# Patient Record
Sex: MALE | Race: WHITE | Employment: UNEMPLOYED | URBAN - METROPOLITAN AREA
[De-identification: names, ages, dates, MRNs, and addresses within clinical notes are randomized per-mention and may not be internally consistent; named-entity substitution may affect disease eponyms.]

---

## 2018-06-14 ENCOUNTER — OFFICE VISIT (OUTPATIENT)
Dept: CARDIOLOGY CLINIC | Age: 53
End: 2018-06-14

## 2018-06-14 VITALS
WEIGHT: 315 LBS | HEIGHT: 71 IN | SYSTOLIC BLOOD PRESSURE: 124 MMHG | HEART RATE: 93 BPM | BODY MASS INDEX: 44.1 KG/M2 | OXYGEN SATURATION: 95 % | DIASTOLIC BLOOD PRESSURE: 70 MMHG

## 2018-06-14 DIAGNOSIS — G47.30 SLEEP APNEA, UNSPECIFIED TYPE: ICD-10-CM

## 2018-06-14 DIAGNOSIS — I10 ESSENTIAL HYPERTENSION: ICD-10-CM

## 2018-06-14 DIAGNOSIS — I48.91 ATRIAL FIBRILLATION, UNSPECIFIED TYPE (HCC): ICD-10-CM

## 2018-06-14 DIAGNOSIS — F17.200 SMOKING: ICD-10-CM

## 2018-06-14 DIAGNOSIS — Z95.0 CARDIAC PACEMAKER IN SITU: Primary | ICD-10-CM

## 2018-06-14 PROCEDURE — 93280 PM DEVICE PROGR EVAL DUAL: CPT | Performed by: INTERNAL MEDICINE

## 2018-06-14 PROCEDURE — 4004F PT TOBACCO SCREEN RCVD TLK: CPT | Performed by: INTERNAL MEDICINE

## 2018-06-14 PROCEDURE — 99214 OFFICE O/P EST MOD 30 MIN: CPT | Performed by: INTERNAL MEDICINE

## 2018-06-14 PROCEDURE — G8427 DOCREV CUR MEDS BY ELIG CLIN: HCPCS | Performed by: INTERNAL MEDICINE

## 2018-06-14 PROCEDURE — 3017F COLORECTAL CA SCREEN DOC REV: CPT | Performed by: INTERNAL MEDICINE

## 2018-06-14 PROCEDURE — G8417 CALC BMI ABV UP PARAM F/U: HCPCS | Performed by: INTERNAL MEDICINE

## 2018-08-05 PROBLEM — N17.9 AKI (ACUTE KIDNEY INJURY) (HCC): Status: ACTIVE | Noted: 2018-08-05

## 2018-08-05 PROBLEM — N17.9 ACUTE RENAL FAILURE (ARF) (HCC): Status: ACTIVE | Noted: 2018-08-05

## 2018-08-05 PROBLEM — E87.29 HIGH ANION GAP METABOLIC ACIDOSIS: Status: ACTIVE | Noted: 2018-08-05

## 2018-08-05 PROBLEM — R77.8 ELEVATED TROPONIN: Status: ACTIVE | Noted: 2018-08-05

## 2018-08-05 PROBLEM — R79.89 ELEVATED TROPONIN: Status: ACTIVE | Noted: 2018-08-05

## 2018-08-05 PROBLEM — D72.829 LEUKOCYTOSIS: Status: ACTIVE | Noted: 2018-08-05

## 2018-08-06 PROBLEM — E66.01 MORBID OBESITY DUE TO EXCESS CALORIES (HCC): Status: ACTIVE | Noted: 2018-08-06

## 2018-09-04 PROBLEM — R79.89 ELEVATED TROPONIN: Status: RESOLVED | Noted: 2018-08-05 | Resolved: 2018-09-04

## 2018-09-04 PROBLEM — R77.8 ELEVATED TROPONIN: Status: RESOLVED | Noted: 2018-08-05 | Resolved: 2018-09-04

## 2019-06-13 ENCOUNTER — OFFICE VISIT (OUTPATIENT)
Dept: CARDIOLOGY CLINIC | Age: 54
End: 2019-06-13
Payer: MEDICARE

## 2019-06-13 ENCOUNTER — PROCEDURE VISIT (OUTPATIENT)
Dept: CARDIOLOGY CLINIC | Age: 54
End: 2019-06-13
Payer: MEDICARE

## 2019-06-13 VITALS
BODY MASS INDEX: 44.1 KG/M2 | DIASTOLIC BLOOD PRESSURE: 80 MMHG | SYSTOLIC BLOOD PRESSURE: 122 MMHG | HEIGHT: 71 IN | HEART RATE: 94 BPM | WEIGHT: 315 LBS

## 2019-06-13 DIAGNOSIS — I10 BENIGN ESSENTIAL HTN: ICD-10-CM

## 2019-06-13 DIAGNOSIS — Z95.0 CARDIAC PACEMAKER IN SITU: ICD-10-CM

## 2019-06-13 DIAGNOSIS — E66.9 OBESITY (BMI 30-39.9): ICD-10-CM

## 2019-06-13 DIAGNOSIS — G47.33 OSA (OBSTRUCTIVE SLEEP APNEA): ICD-10-CM

## 2019-06-13 DIAGNOSIS — I48.91 ATRIAL FIBRILLATION, UNSPECIFIED TYPE (HCC): Primary | ICD-10-CM

## 2019-06-13 DIAGNOSIS — F17.200 SMOKING: ICD-10-CM

## 2019-06-13 PROCEDURE — 99214 OFFICE O/P EST MOD 30 MIN: CPT | Performed by: INTERNAL MEDICINE

## 2019-06-13 PROCEDURE — 4004F PT TOBACCO SCREEN RCVD TLK: CPT | Performed by: INTERNAL MEDICINE

## 2019-06-13 PROCEDURE — 93000 ELECTROCARDIOGRAM COMPLETE: CPT | Performed by: INTERNAL MEDICINE

## 2019-06-13 PROCEDURE — G8417 CALC BMI ABV UP PARAM F/U: HCPCS | Performed by: INTERNAL MEDICINE

## 2019-06-13 PROCEDURE — 3017F COLORECTAL CA SCREEN DOC REV: CPT | Performed by: INTERNAL MEDICINE

## 2019-06-13 PROCEDURE — 93280 PM DEVICE PROGR EVAL DUAL: CPT | Performed by: INTERNAL MEDICINE

## 2019-06-13 PROCEDURE — G8427 DOCREV CUR MEDS BY ELIG CLIN: HCPCS | Performed by: INTERNAL MEDICINE

## 2019-06-13 RX ORDER — LISINOPRIL 20 MG/1
20 TABLET ORAL DAILY
COMMUNITY

## 2019-06-13 RX ORDER — NICOTINE 21 MG/24HR
1 PATCH, TRANSDERMAL 24 HOURS TRANSDERMAL EVERY 24 HOURS
COMMUNITY
End: 2022-01-19

## 2019-06-13 RX ORDER — LEVOCARNITINE 330 MG/1
330 TABLET ORAL 2 TIMES DAILY
COMMUNITY

## 2019-08-17 ENCOUNTER — HOSPITAL ENCOUNTER (EMERGENCY)
Age: 54
Discharge: HOME | End: 2019-08-17
Attending: EMERGENCY MEDICINE
Payer: MEDICARE

## 2019-08-17 VITALS
TEMPERATURE: 98.4 F | SYSTOLIC BLOOD PRESSURE: 148 MMHG | HEIGHT: 72 IN | DIASTOLIC BLOOD PRESSURE: 78 MMHG | WEIGHT: 315 LBS | RESPIRATION RATE: 16 BRPM | OXYGEN SATURATION: 98 % | BODY MASS INDEX: 42.66 KG/M2 | HEART RATE: 88 BPM

## 2019-08-17 DIAGNOSIS — H57.89 PERIORBITAL SWELLING: ICD-10-CM

## 2019-08-17 DIAGNOSIS — B30.9 ACUTE VIRAL CONJUNCTIVITIS OF LEFT EYE: Primary | ICD-10-CM

## 2019-08-17 PROCEDURE — 99282 EMERGENCY DEPT VISIT SF MDM: CPT

## 2019-08-17 RX ORDER — KETOTIFEN FUMARATE 0.35 MG/ML
1 SOLUTION/ DROPS OPHTHALMIC 2 TIMES DAILY
Qty: 5 ML | Refills: 0 | Status: SHIPPED | OUTPATIENT
Start: 2019-08-17 | End: 2019-08-27

## 2019-08-17 ASSESSMENT — PAIN DESCRIPTION - PROGRESSION: CLINICAL_PROGRESSION: NOT CHANGED

## 2019-08-17 ASSESSMENT — PAIN DESCRIPTION - ONSET: ONSET: ON-GOING

## 2019-08-17 ASSESSMENT — PAIN - FUNCTIONAL ASSESSMENT: PAIN_FUNCTIONAL_ASSESSMENT: PREVENTS OR INTERFERES SOME ACTIVE ACTIVITIES AND ADLS

## 2019-08-17 ASSESSMENT — PAIN DESCRIPTION - LOCATION: LOCATION: EYE

## 2019-08-17 ASSESSMENT — PAIN DESCRIPTION - FREQUENCY: FREQUENCY: CONTINUOUS

## 2019-08-17 ASSESSMENT — PAIN DESCRIPTION - DESCRIPTORS: DESCRIPTORS: ACHING

## 2019-08-17 ASSESSMENT — PAIN SCALES - GENERAL
PAINLEVEL_OUTOF10: 5
PAINLEVEL_OUTOF10: 8

## 2019-08-17 ASSESSMENT — PAIN DESCRIPTION - ORIENTATION: ORIENTATION: LEFT

## 2019-08-17 ASSESSMENT — PAIN DESCRIPTION - PAIN TYPE: TYPE: ACUTE PAIN

## 2019-08-17 NOTE — ED PROVIDER NOTES
eMERGENCY dEPARTMENT eNCOUnter      Pt Name: Adolph Mccarthy  MRN: [[id number]]  [place] [id number]  Date of evaluation: 8/17/2019  Provider: David Acevedo MD     [address] [place] [address]       Chief Complaint   Patient presents with    Facial Swelling     Left eye swelling has been taking eye drops, states is painful. HISTORY OF PRESENT ILLNESS   (Location/Symptom, Timing/Onset,Context/Setting, Quality, Duration, Modifying Factors, Severity) Note limiting factors. HPI    Adolph Mccarthy is a 48 y.o. male who presents to the emergency department presents with left eye swelling. Patient apparently was seen by family doctor about 10 to 15 days ago with conjunctivitis. Was placed on gentamicin. Patient continues to rub it patient has a caregiver here. Caregiver has been taking care of him for about 2 years. Patient has history of bipolar. Patient does not understand because it itches and he is been rubbing it until now the lower lid is swollen. No drainage. His conjunctiva is still red. Nursing Notes were reviewed. REVIEW OFSYSTEMS    (2+ for level 4; 10+ for level 5)   Review of Systems    General: No fevers, chills or night sweats, No weight loss    Head:  No Sore throat,  No Ear Pain    Chest:  Nontender. No Cough, No SOB,  Chest Pain    GI: No abdominal pain or vomiting    : No dysuria or hematuria    Musculoskeletal: No unrelenting pain or night pain    Neurologic: No bowel or bladder incontinence, No saddle anesthesia, No leg weakness    All other systems reviewed and are negative.         PAST MEDICAL HISTORY     Past Medical History:   Diagnosis Date    Development delay     Diabetes mellitus ([facility] [address]     Hyperlipidemia     Hypertension     Obesity        SURGICAL HISTORY       Past Surgical History:   Procedure Laterality Date    PACEMAKER INSERTION  2010       CURRENT MEDICATIONS       Discharge Medication List as of 8/17/2019  5:53 PM      CONTINUE these medications which have NOT

## 2019-09-24 ENCOUNTER — NURSE ONLY (OUTPATIENT)
Dept: CARDIOLOGY CLINIC | Age: 54
End: 2019-09-24
Payer: MEDICARE

## 2019-09-24 DIAGNOSIS — Z95.0 CARDIAC PACEMAKER IN SITU: Primary | ICD-10-CM

## 2019-09-24 PROCEDURE — 93296 REM INTERROG EVL PM/IDS: CPT | Performed by: INTERNAL MEDICINE

## 2019-09-24 PROCEDURE — 93294 REM INTERROG EVL PM/LDLS PM: CPT | Performed by: INTERNAL MEDICINE

## 2020-01-10 ENCOUNTER — TELEPHONE (OUTPATIENT)
Dept: CARDIOLOGY CLINIC | Age: 55
End: 2020-01-10

## 2020-01-10 NOTE — TELEPHONE ENCOUNTER
Called pt spoke to his care giver to send remote transmission anytime on 01/14/2020. She stated that pt sent one a day or two ago.  Pls call to advise Thank you

## 2020-01-13 NOTE — PROGRESS NOTES
Carelink transmission shows normal sensing and pacing function. Noted AT. See interrogation for more details.

## 2020-01-14 ENCOUNTER — NURSE ONLY (OUTPATIENT)
Dept: CARDIOLOGY CLINIC | Age: 55
End: 2020-01-14
Payer: MEDICARE

## 2020-01-14 PROCEDURE — 93294 REM INTERROG EVL PM/LDLS PM: CPT | Performed by: INTERNAL MEDICINE

## 2020-01-14 PROCEDURE — 93296 REM INTERROG EVL PM/IDS: CPT | Performed by: INTERNAL MEDICINE

## 2020-01-14 NOTE — LETTER
[address] [facility] [phone] [address] [phone]    Pacemaker/Defibrillator Clinic          01/13/20        [address] [address]        Dear Braeden Mauricio    This letter is to inform you that we received the transmission from your monitor at home that checks your implanted heart device. The next date you should transmit will be 4-15-20. If your report requires attention, we will notify you. Please be aware that the remote device transmission sites are periodically monitored only during regular business hours during which simultaneous in-office device clinics are being run. If your transmission requires attention, we will contact you as soon as possible. Thank you.             [address]

## 2020-03-25 PROBLEM — N17.9 ACUTE RENAL FAILURE (ARF) (HCC): Status: RESOLVED | Noted: 2018-08-05 | Resolved: 2020-03-24

## 2020-04-15 ENCOUNTER — NURSE ONLY (OUTPATIENT)
Dept: CARDIOLOGY CLINIC | Age: 55
End: 2020-04-15
Payer: MEDICARE

## 2020-04-15 PROCEDURE — 93296 REM INTERROG EVL PM/IDS: CPT | Performed by: INTERNAL MEDICINE

## 2020-04-15 PROCEDURE — 93294 REM INTERROG EVL PM/LDLS PM: CPT | Performed by: INTERNAL MEDICINE

## 2020-07-15 ENCOUNTER — NURSE ONLY (OUTPATIENT)
Dept: CARDIOLOGY CLINIC | Age: 55
End: 2020-07-15
Payer: MEDICARE

## 2020-07-15 PROCEDURE — 93296 REM INTERROG EVL PM/IDS: CPT | Performed by: INTERNAL MEDICINE

## 2020-07-15 PROCEDURE — 93294 REM INTERROG EVL PM/LDLS PM: CPT | Performed by: INTERNAL MEDICINE

## 2020-07-15 NOTE — PROGRESS NOTES
Carelink transmission shows normal sensing and pacing function. Noted ST. Pt is not on a beta blocker. See interrogation for more details.

## 2020-07-15 NOTE — LETTER
[address] [facility] [phone]  Luige Ramon [address] [facility] [phone]    Pacemaker/Defibrillator Clinic          07/15/20        [address] [address]        Dear Kg Reilly    This letter is to inform you that we received the transmission from your monitor at home that checks your implanted heart device. The next date you should transmit will be 10-19-20. If your report requires attention, we will notify you. Please be aware that the remote device transmission sites are periodically monitored only during regular business hours during which simultaneous in-office device clinics are being run. If your transmission requires attention, we will contact you as soon as possible. Thank you.             Hang Astudillo

## 2020-10-19 ENCOUNTER — NURSE ONLY (OUTPATIENT)
Dept: CARDIOLOGY CLINIC | Age: 55
End: 2020-10-19
Payer: MEDICARE

## 2020-10-19 PROCEDURE — 93296 REM INTERROG EVL PM/IDS: CPT | Performed by: INTERNAL MEDICINE

## 2020-10-19 PROCEDURE — 93294 REM INTERROG EVL PM/LDLS PM: CPT | Performed by: INTERNAL MEDICINE

## 2020-10-19 NOTE — LETTER
[address] [facility] [phone] [address] [phone]    Pacemaker/Defibrillator Clinic          10/21/20        [address] [facility] [address] [address]        Dear Griselda Powell    This letter is to inform you that we received the transmission from your monitor at home that checks your implanted heart device. The next date you should transmit will be 1-19-21. If your report requires attention, we will notify you. Please be aware that the remote device transmission sites are periodically monitored only during regular business hours during which simultaneous in-office device clinics are being run. If your transmission requires attention, we will contact you as soon as possible. Thank you.             Alyce [address]

## 2020-10-21 NOTE — PROGRESS NOTES
We received remote transmission from patient's monitor at home. Transmission shows normal sensing and pacing function. Noted AT. EP physician will review. See interrogation under cardiology tab in the [address] [facility] [address] field for more details.

## 2020-10-26 ENCOUNTER — TELEPHONE (OUTPATIENT)
Dept: CARDIOLOGY CLINIC | Age: 55
End: 2020-10-26

## 2020-10-26 NOTE — TELEPHONE ENCOUNTER
----- Message from Miguel Polo MD sent at 10/21/2020  9:46 AM EDT -----  Needs f/u anytime next year with NP

## 2021-01-12 PROBLEM — G47.33 OBSTRUCTIVE SLEEP APNEA: Status: ACTIVE | Noted: 2021-01-12

## 2021-01-12 PROBLEM — Z95.0 PACEMAKER: Status: ACTIVE | Noted: 2021-01-12

## 2021-01-12 PROBLEM — Z72.0 TOBACCO ABUSE: Status: ACTIVE | Noted: 2021-01-12

## 2021-01-12 NOTE — PROGRESS NOTES
[address]   Electrophysiology Follow Up   Date: 1/13/2021      No chief complaint on file. HPI: Katina Rodriguez is a 50 y.o. being seen for a maintenance evaluation for atrial fibrillation. PMH developmental delay, YURI, HLD. S/P pacemaker implant due to high degree AV block with pauses up to 10 sec     Interval Hx: Jasen Raphael presents today in follow up. Patient denies lightheadedness, dizziness, chest pain, palpitations, orthopnea, edema, presyncope or syncope. States he is still smoking. Past Medical History:   Diagnosis Date    Development delay     Diabetes mellitus ([facility] [address]     Hyperlipidemia     Hypertension     Obesity     YURI    Past Surgical History:   Procedure Laterality Date    PACEMAKER INSERTION  2010       Allergies:  No Known Allergies    Social History:   reports that he has been smoking. He has never used smokeless tobacco. He reports that he does not drink alcohol or use drugs. Family History:     Reviewed. Denies family history of sudden cardiac death, arrhythmia, premature CAD    Review of System:  All other systems reviewed and are negative except for that noted above. Pertinent negatives are:   General: negative for fever, chills   Ophthalmic ROS: negative for - eye pain or loss of vision  ENT ROS: negative for - headaches, sore throat   Respiratory: negative for - cough, sputum  Cardiovascular: Reviewed in HPI  Gastrointestinal: negative for - abdominal pain, diarrhea, N/V  Hematology: negative for - bleeding, blood clots, bruising or jaundice  Genito-Urinary:  negative for - Dysuria or incontinence  Musculoskeletal: negative for - Joint swelling, muscle pain. Knee pain  Neurological: negative for - confusion, dizziness, headaches   Psychiatric: No anxiety, no depression.   Dermatological: negative for - rash    Physical Examination:  Vitals:    01/13/21 1339   BP: 136/75   Pulse: 94   SpO2: 96%      Wt Readings from Last 3 Encounters: 21 (!) 314 lb (142.4 kg)   19 (!) 354 lb 15.1 oz (161 kg)   19 (!) 357 lb (161.9 kg)       · Constitutional: Oriented. No distress. · Head: Normocephalic and atraumatic. · Mouth/Throat: Oropharynx is clear and moist.   · Eyes: Conjunctivae normal. EOM are normal.   · Neck: Neck supple. No rigidity. No JVD present. · Cardiovascular: Normal rate, regular rhythm, S1&S2. · Pulmonary/Chest: Bilateral respiratory sounds. No wheezes, No rhonchi. · Abdominal: Soft. Bowel sounds present. No distension, No tenderness. · Musculoskeletal: No tenderness. No edema  Knee pain  · Lymphadenopathy: Has no cervical adenopathy. · Neurological: Alert and oriented. Cranial nerve appears intact, No Gross deficit   · Skin: Skin is warm and dry. No rash noted. · Psychiatric: Has a normal behavior       Labs, diagnostic and imaging results reviewed. Reviewed. Lab Results   Component Value Date    CREATININE 1.2 2018    CREATININE 1.4 2018    AST 24 2018    ALT 25 2018     EC2021: Sinus Rhythm    Echo 2D complete 2016  - Study data: Comparison was made to the study of 2010. No  change. - Left ventricle: The cavity size was normal. Wall thickness was  normal. Systolic function was normal. The estimated ejection  fraction was in the range of 50% to 55%. Wall motion was normal;  there were no regional wall motion abnormalities    2010-ECHO  Study Conclusions  Left ventricle: The cavity size was normal. Wall thickness was normal. Systolic function was normal. The estimated ejection fraction was in the range of 55% to 60%. Left ventricular diastolic function parameters were normal. Impressions: Compared to the prior study, there has been no significant interval change.     Echo 3/25/08 STUDY CONCLUSIONS  divalproex (DEPAKOTE ER) 250 MG extended release tablet Take 250 mg by mouth every morning (Take with 500 mg tablet)      atorvastatin (LIPITOR) 20 MG tablet Take 20 mg by mouth daily      perphenazine 8 MG tablet Take 8 mg by mouth 3 times daily      benztropine (COGENTIN) 0.5 MG tablet Take 0.5 mg by mouth every evening      topiramate (TOPAMAX) 50 MG tablet Take 50 mg by mouth 2 times daily (Take with 100 mg tablets)      divalproex (DEPAKOTE ER) 500 MG ER tablet Take 500 mg by mouth nightly      aspirin 81 MG tablet Take 81 mg by mouth daily.  guanFACINE (TENEX) 1 MG tablet Take 1.5 mg by mouth 2 times daily @ 0800 and 1600      Asenapine Maleate (SAPHRIS) 10 MG SUBL Place 10 mg under the tongue 2 times daily       nicotine (NICODERM CQ) 21 MG/24HR Place 1 patch onto the skin every 24 hours       No current facility-administered medications for this visit. Assessment and plan:     Atrial fibrillation:   -EKG 1/13/21 Sinus Rhythm   - no Atrial fibrillation on device interrogation today. No need for anticoagulation    No recurrence     CARDIAC PACEMAKER IN SITU (ICD-V45.01)   - s/p Dual chamber pacemaker 10/27/10   The CIED was interrogated and programmed and I supervised and reviewed all the data. All findings and changes are in device interrogation sheat and reflect my personal interpretation and changes and is scanned to Epic. AP 16.2,  3.7,DELMA 3 y,     YURI  -encouraged compliance with CPAP    Smoking  Advised to stop again. HTN  Vitals:    01/13/21 1339   BP: 136/75   Pulse: 94   SpO2: 96%     -Home BP monitoring encourage with a BP goal <130/80   - on lisinopril  20 mg daily. BP is acceptable. Obesity   Excessive weight is complicating assessment and treatment. It is placing patient at risk for multiple co-morbidities as well as early death and contributing to the patient's presentation.    - discussed weight management with diet and exercise Seems to have lost some weight based on weight measurements and I reemphasized that for him. - The patient is counseled to follow a low salt diet to assure blood pressure remains controlled for cardiovascular risk factor modification.   - The patient is counseled to avoid excess caffeine, and energy drinks as this may exacerbated ectopy and arrhythmia. - The patient is counseled to get regular exercise 3-5 times per week to control cardiovascular risk factors. - The patient is counseled to lose weigt to control cardiovascular risk factors. Plan:   Follow up with EP NP in one year. I independently reviewed *device check interrogation     Thank you for allowing me to participate in the care of Daniel Vera. Further evaluation will be based upon the patient's clinical course and testing results. All questions and concerns were addressed to the patient/family. Alternatives to my treatment were discussed. I have discussed the above stated plan and the patient verbalized understanding and agreed with the plan. NOTE: This report was transcribed using voice recognition software. Every effort was made to ensure accuracy, however, inadvertent computerized transcription errors may be present.        Shelbie Vincent MD, [facility], [address] [place] [address]   Office: [phone]   I, Dr. Shelbie Vincent personally performed the services described in this documentation as scribed by RN in my presence, and it is both accurate and complete.

## 2021-01-13 ENCOUNTER — OFFICE VISIT (OUTPATIENT)
Dept: CARDIOLOGY CLINIC | Age: 56
End: 2021-01-13
Payer: MEDICARE

## 2021-01-13 ENCOUNTER — NURSE ONLY (OUTPATIENT)
Dept: CARDIOLOGY CLINIC | Age: 56
End: 2021-01-13
Payer: MEDICARE

## 2021-01-13 VITALS
DIASTOLIC BLOOD PRESSURE: 75 MMHG | WEIGHT: 314 LBS | BODY MASS INDEX: 42.53 KG/M2 | HEART RATE: 94 BPM | SYSTOLIC BLOOD PRESSURE: 136 MMHG | OXYGEN SATURATION: 96 % | HEIGHT: 72 IN

## 2021-01-13 DIAGNOSIS — I49.5 SSS (SICK SINUS SYNDROME) (HCC): ICD-10-CM

## 2021-01-13 DIAGNOSIS — I48.91 ATRIAL FIBRILLATION, UNSPECIFIED TYPE (HCC): Primary | ICD-10-CM

## 2021-01-13 DIAGNOSIS — I44.30 AV BLOCK: ICD-10-CM

## 2021-01-13 DIAGNOSIS — Z95.0 PACEMAKER: ICD-10-CM

## 2021-01-13 DIAGNOSIS — I10 HYPERTENSION, UNSPECIFIED TYPE: ICD-10-CM

## 2021-01-13 DIAGNOSIS — E66.01 MORBID OBESITY DUE TO EXCESS CALORIES (HCC): ICD-10-CM

## 2021-01-13 DIAGNOSIS — Z72.0 TOBACCO ABUSE: ICD-10-CM

## 2021-01-13 DIAGNOSIS — G47.33 OBSTRUCTIVE SLEEP APNEA: ICD-10-CM

## 2021-01-13 DIAGNOSIS — Z95.0 CARDIAC PACEMAKER IN SITU: ICD-10-CM

## 2021-01-13 PROCEDURE — G8417 CALC BMI ABV UP PARAM F/U: HCPCS | Performed by: INTERNAL MEDICINE

## 2021-01-13 PROCEDURE — G8484 FLU IMMUNIZE NO ADMIN: HCPCS | Performed by: INTERNAL MEDICINE

## 2021-01-13 PROCEDURE — 3017F COLORECTAL CA SCREEN DOC REV: CPT | Performed by: INTERNAL MEDICINE

## 2021-01-13 PROCEDURE — 93000 ELECTROCARDIOGRAM COMPLETE: CPT | Performed by: INTERNAL MEDICINE

## 2021-01-13 PROCEDURE — 93280 PM DEVICE PROGR EVAL DUAL: CPT | Performed by: INTERNAL MEDICINE

## 2021-01-13 PROCEDURE — 4004F PT TOBACCO SCREEN RCVD TLK: CPT | Performed by: INTERNAL MEDICINE

## 2021-01-13 PROCEDURE — 99214 OFFICE O/P EST MOD 30 MIN: CPT | Performed by: INTERNAL MEDICINE

## 2021-01-13 PROCEDURE — G8427 DOCREV CUR MEDS BY ELIG CLIN: HCPCS | Performed by: INTERNAL MEDICINE

## 2021-01-14 NOTE — PROGRESS NOTES
Patient comes in for programming evaluation for his pacemaker. All sensing and pacing parameters are within normal range. Battery life 3 years  AP 16.4%.  3.2%. No changes need to be made at this time. Please see interrogation for more detail. Patient will see Dr. Rubi Arambula today and follow up in 3 months in office or remotely.

## 2021-04-19 ENCOUNTER — NURSE ONLY (OUTPATIENT)
Dept: CARDIOLOGY CLINIC | Age: 56
End: 2021-04-19
Payer: MEDICARE

## 2021-04-19 DIAGNOSIS — Z95.0 CARDIAC PACEMAKER IN SITU: ICD-10-CM

## 2021-04-19 NOTE — PROGRESS NOTES
We received remote transmission from patient's monitor at home. Transmission shows normal sensing and pacing function. Noted AT. EP physician will review. See interrogation under cardiology tab in the [address] [facility] [address] field for more details.

## 2021-05-07 PROCEDURE — 93296 REM INTERROG EVL PM/IDS: CPT | Performed by: INTERNAL MEDICINE

## 2021-05-07 PROCEDURE — 93294 REM INTERROG EVL PM/LDLS PM: CPT | Performed by: INTERNAL MEDICINE

## 2021-07-20 ENCOUNTER — NURSE ONLY (OUTPATIENT)
Dept: CARDIOLOGY CLINIC | Age: 56
End: 2021-07-20

## 2021-07-20 DIAGNOSIS — Z95.0 CARDIAC PACEMAKER IN SITU: ICD-10-CM

## 2021-07-20 PROCEDURE — 93296 REM INTERROG EVL PM/IDS: CPT | Performed by: INTERNAL MEDICINE

## 2021-07-20 PROCEDURE — 93294 REM INTERROG EVL PM/LDLS PM: CPT | Performed by: INTERNAL MEDICINE

## 2021-07-20 NOTE — PROGRESS NOTES
We received remote transmission from patient's monitor at home. Transmission shows normal sensing and pacing function. Noted NSVT and SVT. Pt is not on a beta blocker. EP physician will review. See interrogation under cardiology tab in the [address] [place] [address] [address] field for more details.

## 2021-07-20 NOTE — LETTER
[address] [facility] [phone]  [place]- [address] [facility] [phone]    Pacemaker/Defibrillator Clinic        07/20/21        [address] [address]      Dear Beck Davidson    This letter is to inform you that we received the transmission from your monitor at home that checks your implanted heart device. The next date you should transmit will be 10-19-21. If your report requires attention, we will notify you. Please be aware that the remote device transmission sites are periodically monitored only during regular business hours during which simultaneous in-office device clinics are being run. If your transmission requires attention, we will contact you as soon as possible. **PLEASE NOTE** that our [facility] policy and processes are changing to ensure a more seamless approach for all parties involved, allowing more time for our nurses to address patient issues and concerns. We will no longer be sending letters for NORMAL remote transmissions. You will be contacted by phone if your transmission requires attention (as previously done), and letters will only be sent regarding monitor disconnections or missed transmissions if you are unable to be reached by phone. Please do not be alarmed by this new process, as we will continue to contact you when you are due for your transmission AND/OR if your transmission report requires attention. This will be your final NORMAL remote received letter. From this point forward, the [facility] will be utilizing the no news is good news approach. As always, please feel free to contact your nurse with any questions or concerns. Thank you.       Naima

## 2021-10-19 ENCOUNTER — NURSE ONLY (OUTPATIENT)
Dept: CARDIOLOGY CLINIC | Age: 56
End: 2021-10-19
Payer: MEDICARE

## 2021-10-19 DIAGNOSIS — Z95.0 PACEMAKER: Primary | ICD-10-CM

## 2021-10-20 PROCEDURE — 93296 REM INTERROG EVL PM/IDS: CPT | Performed by: INTERNAL MEDICINE

## 2021-10-20 PROCEDURE — 93294 REM INTERROG EVL PM/LDLS PM: CPT | Performed by: INTERNAL MEDICINE

## 2021-10-20 NOTE — PROGRESS NOTES
We received remote transmission from patient's monitor at home. Transmission shows normal sensing and pacing function. Noted SVT. EP physician will review. See interrogation under cardiology tab in the [address] [facility] [address] field for more details.

## 2022-01-19 ENCOUNTER — OFFICE VISIT (OUTPATIENT)
Dept: CARDIOLOGY CLINIC | Age: 57
End: 2022-01-19
Payer: MEDICARE

## 2022-01-19 ENCOUNTER — NURSE ONLY (OUTPATIENT)
Dept: CARDIOLOGY CLINIC | Age: 57
End: 2022-01-19
Payer: MEDICARE

## 2022-01-19 VITALS
HEIGHT: 72 IN | OXYGEN SATURATION: 97 % | DIASTOLIC BLOOD PRESSURE: 80 MMHG | SYSTOLIC BLOOD PRESSURE: 121 MMHG | BODY MASS INDEX: 42.59 KG/M2 | HEART RATE: 80 BPM

## 2022-01-19 DIAGNOSIS — I48.0 PAROXYSMAL ATRIAL FIBRILLATION (HCC): ICD-10-CM

## 2022-01-19 DIAGNOSIS — I49.5 SSS (SICK SINUS SYNDROME) (HCC): ICD-10-CM

## 2022-01-19 DIAGNOSIS — Z72.0 TOBACCO ABUSE: ICD-10-CM

## 2022-01-19 DIAGNOSIS — E66.01 MORBID OBESITY DUE TO EXCESS CALORIES (HCC): ICD-10-CM

## 2022-01-19 DIAGNOSIS — I10 HYPERTENSION, UNSPECIFIED TYPE: ICD-10-CM

## 2022-01-19 DIAGNOSIS — Z95.0 PACEMAKER: ICD-10-CM

## 2022-01-19 DIAGNOSIS — I48.0 PAROXYSMAL ATRIAL FIBRILLATION (HCC): Primary | ICD-10-CM

## 2022-01-19 DIAGNOSIS — Z95.0 CARDIAC PACEMAKER IN SITU: ICD-10-CM

## 2022-01-19 DIAGNOSIS — G47.33 OBSTRUCTIVE SLEEP APNEA: ICD-10-CM

## 2022-01-19 PROCEDURE — G8417 CALC BMI ABV UP PARAM F/U: HCPCS | Performed by: INTERNAL MEDICINE

## 2022-01-19 PROCEDURE — G8427 DOCREV CUR MEDS BY ELIG CLIN: HCPCS | Performed by: INTERNAL MEDICINE

## 2022-01-19 PROCEDURE — 99214 OFFICE O/P EST MOD 30 MIN: CPT | Performed by: INTERNAL MEDICINE

## 2022-01-19 PROCEDURE — 93000 ELECTROCARDIOGRAM COMPLETE: CPT | Performed by: INTERNAL MEDICINE

## 2022-01-19 PROCEDURE — 3017F COLORECTAL CA SCREEN DOC REV: CPT | Performed by: INTERNAL MEDICINE

## 2022-01-19 PROCEDURE — G8484 FLU IMMUNIZE NO ADMIN: HCPCS | Performed by: INTERNAL MEDICINE

## 2022-01-19 PROCEDURE — 4004F PT TOBACCO SCREEN RCVD TLK: CPT | Performed by: INTERNAL MEDICINE

## 2022-01-19 RX ORDER — MELATONIN 10 MG
10 CAPSULE ORAL NIGHTLY
COMMUNITY

## 2022-01-19 RX ORDER — CHOLECALCIFEROL (VITAMIN D3) 1250 MCG
CAPSULE ORAL WEEKLY
COMMUNITY

## 2022-01-19 NOTE — PROGRESS NOTES
[facility]   Electrophysiology Follow Up   Date: 1/19/2022      Chief Complaint   Patient presents with    1 Year Follow Up     afib ; no cardiac complaints at this time     Atrial Fibrillation      HPI: Jovita Nicholas is a 49 y.o. being seen for a maintenance evaluation for atrial fibrillation. PMH developmental delay, YURI, HLD. S/P pacemaker implant due to high degree AV block with pauses up to 10 sec     Interval Hx: He is feeling fine. He   vapes. Assessment and plan:     Atrial fibrillation   - noted on device check \" a while back\"     - EKG today    - burden on device today    - Patient has a SRF1MG0-AUVy Score of 1 ( HTN)    No recurrence     CARDIAC PACEMAKER IN SITU (ICD-V45.01)   - s/p Dual chamber pacemaker 10/27/10   - The CIED was interrogated and programmed and I supervised and reviewed all the data. All findings and changes are in device interrogation sheet and reflect my personal interpretation and changes and is scanned to Epic.     30 months remaining, AP 13.8% ,  4%      YURI  -Stable: Uses CPAP/Bipap/APAP  -Encourage to use machine to prevent long term effects of untreated YURI      Smoking   - discussed cessation including vaping       HTN  -Controlled/  -BP goal <130/80  -Home BP monitoring encouraged, printed information provided on how to accurately measure BP at home.  -Counseled to follow a low salt diet to assure blood pressure remains controlled for cardiovascular risk factor modification.   -The patient is counseled to get regular exercise 3-5 times per week and maintain a healthy weight reduce cardiovascular risk factors. - on lisinopril  20 mg daily, tenex 1.5 mg daily         Obesity  Body mass index is 42.59 kg/m². -Excessive weight is complicating assessment and treatment.  It is placing patient at risk for multiple co-morbidities as well as early death and contributing to the patient's presentation.  -Discussed weight management with diet and exercise - The patient is counseled to follow a low salt diet to assure blood pressure remains controlled for cardiovascular risk factor modification.   - The patient is counseled to avoid excess caffeine, and energy drinks as this may exacerbated ectopy and arrhythmia. - The patient is counseled to get regular exercise 3-5 times per week to control cardiovascular risk factors. - The patient is counseled to lose weigt to control cardiovascular risk factors. Plan:   Work on [employer]  Stop vape  Try to exercise more  Follow up in one year with [facility]          Past Medical History:   Diagnosis Date    Development delay     Diabetes mellitus ([address]     Hyperlipidemia     Hypertension     Obesity     YURI    Past Surgical History:   Procedure Laterality Date    PACEMAKER INSERTION  2010       Allergies:  No Known Allergies    Social History:   reports that he has been smoking. He has never used smokeless tobacco. He reports that he does not drink alcohol and does not use drugs. Family History:     Reviewed. Denies family history of sudden cardiac death, arrhythmia, premature CAD    Review of System:  All other systems reviewed and are negative except for that noted above. Pertinent negatives are:   General: negative for fever, chills   Ophthalmic ROS: negative for - eye pain or loss of vision  ENT ROS: negative for - headaches, sore throat   Respiratory: negative for - cough, sputum  Cardiovascular: Reviewed in HPI  Gastrointestinal: negative for - abdominal pain, diarrhea, N/V  Hematology: negative for - bleeding, blood clots, bruising or jaundice  Genito-Urinary:  negative for - Dysuria or incontinence  Musculoskeletal: negative for - Joint swelling, muscle pain. Knee pain  Neurological: negative for - confusion, dizziness, headaches   Psychiatric: No anxiety, no depression.   Dermatological: negative for - rash    Physical Examination:  Vitals:    01/19/22 1559   BP: 121/80   Pulse: 80   SpO2: 97%      Wt Readings from Last 3 Encounters:   21 (!) 314 lb (142.4 kg)   19 (!) 354 lb 15.1 oz (161 kg)   19 (!) 357 lb (161.9 kg)       · Constitutional: Oriented. No distress. · Head: Normocephalic and atraumatic. · Mouth/Throat: Oropharynx is clear and moist.   · Eyes: Conjunctivae normal. EOM are normal.   · Neck: Neck supple. No rigidity. No JVD present. · Cardiovascular: Normal rate, regular rhythm, S1&S2. · Pulmonary/Chest: Bilateral respiratory sounds. No wheezes, No rhonchi. · Abdominal: Soft. Bowel sounds present. No distension, No tenderness. · Musculoskeletal: No tenderness. No edema  Knee pain  · Lymphadenopathy: Has no cervical adenopathy. · Neurological: Alert and oriented. Cranial nerve appears intact, No Gross deficit   · Skin: Skin is warm and dry. No rash noted. · Psychiatric: Has a normal behavior       Labs, diagnostic and imaging results reviewed. Reviewed. Lab Results   Component Value Date    CREATININE 1.2 2018    CREATININE 1.4 2018    AST 24 2018    ALT 25 2018     EC2022: Sinus Rhythm, right bundle branch block    Echo 2D complete 2016  - Study data: Comparison was made to the study of 2010. No  change. - Left ventricle: The cavity size was normal. Wall thickness was  normal. Systolic function was normal. The estimated ejection  fraction was in the range of 50% to 55%. Wall motion was normal;  there were no regional wall motion abnormalities    2010-ECHO  Study Conclusions  Left ventricle: The cavity size was normal. Wall thickness was normal. Systolic function was normal. The estimated ejection fraction was in the range of 55% to 60%. Left ventricular diastolic function parameters were normal. Impressions: Compared to the prior study, there has been no significant interval change.     Echo 3/25/08 STUDY CONCLUSIONS  - Left ventricular size was normal. Overall left ventricular systolic function was normal. Left ventricular ejection fraction was estimated to be 65 %. There were no left ventricular regional wall motion abnormalities. Estimated peak pulmonary artery systolic pressure could not be determined, due to inadequate tricuspid regurgitation. However, there are no indirect indications of moderate or severe pulmonary hypertension. Previous Heart Catheterization/PCI: 11/3/2010 PROCEDURES PERFORMED:    1. Left heart catheterization through right radial artery approach. 2. Left ventriculography. FINDINGS:  1. The patient essentially has no significant coronary artery disease. 2. 2. The patient has normal left main that bifurcates into LAD, left  circumflex. LAD is free of any significant atherosclerotic disease. It  gives off first and second diagonal branches free of any significant  disease. 3. Left circumflex gives off an OM1 and OM2 branches, free of any disease. 4. RCA is a dominant vessel free of any significant disease. 5. LVEF 55%. 6. LVEDP was 20.  7. There was no gradient across the aortic valve. 8. Right radial arterial site was strapped with a TR band for adequate  hemostasis, which was adequately achieved.       Medication:  Current Outpatient Medications   Medication Sig Dispense Refill    Cholecalciferol (VITAMIN D3) 1.25 MG (96838 UT) CAPS Take by mouth once a week      melatonin 10 MG CAPS capsule Take 10 mg by mouth nightly      lisinopril (PRINIVIL;ZESTRIL) 20 MG tablet Take 20 mg by mouth daily      levOCARNitine (CARNITOR) 330 MG tablet Take 330 mg by mouth 2 times daily      metFORMIN (GLUCOPHAGE-XR) 500 MG extended release tablet Take 1,000 mg by mouth 2 times daily       hydrOXYzine (ATARAX) 10 MG tablet Take 10 mg by mouth 3 times daily       fenofibrate micronized (LOFIBRA) 134 MG capsule 1 CAP BY MOUTH EVERY MORNING      divalproex (DEPAKOTE ER) 500 MG extended release tablet Take 500 mg by mouth every morning (Take with 250 mg tablet)      divalproex (DEPAKOTE ER) 250 MG extended release tablet Take 250 mg by mouth every morning (Take with 500 mg tablet)      atorvastatin (LIPITOR) 20 MG tablet Take 20 mg by mouth daily      perphenazine 8 MG tablet Take 8 mg by mouth 2 times daily       benztropine (COGENTIN) 0.5 MG tablet Take 0.5 mg by mouth every evening      topiramate (TOPAMAX) 50 MG tablet Take 50 mg by mouth 2 times daily (Take with 100 mg tablets)      divalproex (DEPAKOTE ER) 500 MG ER tablet Take 500 mg by mouth nightly      aspirin 81 MG tablet Take 81 mg by mouth daily.  guanFACINE (TENEX) 1 MG tablet Take 1.5 mg by mouth 2 times daily @ 0800 and 1600      Asenapine Maleate (SAPHRIS) 10 MG SUBL Place 10 mg under the tongue 2 times daily        No current facility-administered medications for this visit. I independently reviewed *device check interrogation     Thank you for allowing me to participate in the care of Sabine Edwards. Further evaluation will be based upon the patient's clinical course and testing results. All questions and concerns were addressed to the patient/family. Alternatives to my treatment were discussed. I have discussed the above stated plan and the patient verbalized understanding and agreed with the plan. Scribe attestation: This note was scribed in the presence of Jovan Montanez MD by Gail Mariano RN    I, Dr. Jovan Montanez personally performed the services described in this documentation as scribed by RN in my presence, and it is both accurate and complete.        NOTE: This report was transcribed using voice recognition software. Every effort was made to ensure accuracy, however, inadvertent computerized transcription errors may be present.        Jovan Montanez MD, [facility], [address] [place] [address]   Office: [phone]  .

## 2022-01-20 PROCEDURE — 93280 PM DEVICE PROGR EVAL DUAL: CPT | Performed by: INTERNAL MEDICINE

## 2022-01-20 NOTE — PROGRESS NOTES
Patient comes in for programming evaluation for his pacemaker. All sensing and pacing parameters are within normal range. Battery life 27 months  AP 15.8%.  4.0%. 1 AHR episode noted on 12/19/2021 lasting 2 minutes. 76 HVR episodes noted. Last on 1/8/2022, longest 3 1/2 minutes. No changes need to be made at this time. Please see interrogation for more detail. Patient will see Dr. Joaquin Garduno today and follow up in 3 months in office or remotely.

## 2022-01-27 ENCOUNTER — NURSE ONLY (OUTPATIENT)
Dept: CARDIOLOGY CLINIC | Age: 57
End: 2022-01-27
Payer: MEDICARE

## 2022-01-27 DIAGNOSIS — Z95.0 PACEMAKER: ICD-10-CM

## 2022-01-27 DIAGNOSIS — I48.0 PAROXYSMAL ATRIAL FIBRILLATION (HCC): ICD-10-CM

## 2022-01-27 PROCEDURE — 93296 REM INTERROG EVL PM/IDS: CPT | Performed by: INTERNAL MEDICINE

## 2022-01-27 PROCEDURE — 93294 REM INTERROG EVL PM/LDLS PM: CPT | Performed by: INTERNAL MEDICINE

## 2022-01-27 NOTE — PROGRESS NOTES
We received remote transmission from patient's monitor at home. Transmission shows normal sensing and pacing function. Noted ST. EP physician will review. See interrogation under cardiology tab in the [address] [facility] [address] field for more details.

## 2022-05-03 ENCOUNTER — NURSE ONLY (OUTPATIENT)
Dept: CARDIOLOGY CLINIC | Age: 57
End: 2022-05-03
Payer: MEDICARE

## 2022-05-03 DIAGNOSIS — I48.0 PAROXYSMAL ATRIAL FIBRILLATION (HCC): ICD-10-CM

## 2022-05-03 DIAGNOSIS — Z95.0 PACEMAKER: ICD-10-CM

## 2022-05-03 PROCEDURE — 93294 REM INTERROG EVL PM/LDLS PM: CPT | Performed by: INTERNAL MEDICINE

## 2022-05-03 PROCEDURE — 93296 REM INTERROG EVL PM/IDS: CPT | Performed by: INTERNAL MEDICINE

## 2022-05-04 NOTE — PROGRESS NOTES
We received remote transmission from patient's monitor at home. Transmission shows normal sensing and pacing function. Noted ST. EP physician will review. See interrogation under cardiology tab in the [address] [facility] [address] field for more details.

## 2022-08-22 ENCOUNTER — TELEPHONE (OUTPATIENT)
Dept: CARDIOLOGY CLINIC | Age: 57
End: 2022-08-22

## 2023-04-19 ENCOUNTER — NURSE ONLY (OUTPATIENT)
Dept: CARDIOLOGY CLINIC | Age: 58
End: 2023-04-19

## 2023-04-19 DIAGNOSIS — Z95.0 PACEMAKER: ICD-10-CM

## 2023-04-20 NOTE — PROGRESS NOTES
We received remote transmission from patient's monitor at home. Transmission shows normal sensing and pacing function. Noted AT. Battery has 8 months to DELMA. EP physician will review. See interrogation under cardiology tab in the [address] [place] [address] [address] field for more details. AS-VS-83.5%  AS--1.3%  AP-VS-11.4%  AP--3.9%    End of 91-day monitoring period 4-19-23. Evita Cooley

## 2023-06-20 NOTE — PROGRESS NOTES
Patient comes in for programming evaluation for his pacemaker. Patient has [employer] ADDR01 Adapta-10/27/2010  Hx: high degree AV block with pauses up to 10 sec, PAF    All sensing and pacing parameters are within normal range. Battery life <1 months  AP 15.8%.  4.0%. 208 HVR episodes noted. Last on 6/19/2023, longest 11 1/2 minutes. No changes need to be made at this time. Please see interrogation for more detail. Patient will see Dr. Radha Stanley today and follow up in 1 months in office or remotely. Pt requesting more pain medications      Noreen Santana RN  10/26/18 1215

## 2023-06-20 NOTE — PROGRESS NOTES
[address]   Electrophysiology Follow Up   Date: 6/21/2023      Chief Complaint   Patient presents with    Atrial Fibrillation    1 Year Follow Up     Pt reports no cardiac concerns at this time. HPI: Portia Hernandez is a 66 y.o. being seen for a maintenance evaluation for atrial fibrillation. PMH developmental delay, YURI, HLD. S/P pacemaker implant due to high degree AV block with pauses up to 10 sec     Interval Hx:   Abena Gallego presents today in follow up. He is doing well from a cardiac perspective. He has quit smoking. Assessment and plan:     Atrial fibrillation   - noted on device check \" a while back\"     - EKG today - Sinus with PVC    - burden on device today    - Patient has a [id number] Score of 1 ( HTN)    No recurrence     CARDIAC PACEMAKER IN SITU (ICD-V45.01)   - s/p Dual chamber pacemaker 10/27/10    Interrogation today shows:1 month remaining, AP 10.7% ,  4.0%,  0% AT/AF burden per device interrogation today,     Will change generator when device reaches DELMA,         YURI  -Stable: Uses CPAP/Bipap/APAP  -Encourage to use machine to prevent long term effects of untreated YURI      Smoking   - has stopped       HTN  -Controlled    -BP goal <130/80  -Home BP monitoring encouraged, printed information provided on how to accurately measure BP at home.  -Counseled to follow a low salt diet to assure blood pressure remains controlled for cardiovascular risk factor modification.   -The patient is counseled to get regular exercise 3-5 times per week and maintain a healthy weight reduce cardiovascular risk factors. - on lisinopril,         Obesity  Body mass index is 44.21 kg/m². -Excessive weight is complicating assessment and treatment.  It is placing patient at risk for multiple co-morbidities as well as early death and contributing to the patient's presentation.  -Discussed weight management with diet and exercise          - The patient is counseled to follow a low salt diet

## 2023-06-21 ENCOUNTER — OFFICE VISIT (OUTPATIENT)
Dept: CARDIOLOGY CLINIC | Age: 58
End: 2023-06-21
Payer: MEDICARE

## 2023-06-21 ENCOUNTER — NURSE ONLY (OUTPATIENT)
Dept: CARDIOLOGY CLINIC | Age: 58
End: 2023-06-21
Payer: MEDICARE

## 2023-06-21 VITALS
DIASTOLIC BLOOD PRESSURE: 70 MMHG | OXYGEN SATURATION: 95 % | HEIGHT: 72 IN | WEIGHT: 315 LBS | HEART RATE: 97 BPM | BODY MASS INDEX: 42.66 KG/M2 | SYSTOLIC BLOOD PRESSURE: 100 MMHG

## 2023-06-21 DIAGNOSIS — Z95.0 PACEMAKER: ICD-10-CM

## 2023-06-21 DIAGNOSIS — I48.0 PAROXYSMAL ATRIAL FIBRILLATION (HCC): Primary | ICD-10-CM

## 2023-06-21 DIAGNOSIS — I10 HYPERTENSION, UNSPECIFIED TYPE: ICD-10-CM

## 2023-06-21 DIAGNOSIS — E66.01 MORBID OBESITY DUE TO EXCESS CALORIES (HCC): ICD-10-CM

## 2023-06-21 DIAGNOSIS — I45.5 SINUS PAUSE: ICD-10-CM

## 2023-06-21 DIAGNOSIS — Z95.0 CARDIAC PACEMAKER IN SITU: ICD-10-CM

## 2023-06-21 DIAGNOSIS — G47.33 OBSTRUCTIVE SLEEP APNEA: ICD-10-CM

## 2023-06-21 DIAGNOSIS — I44.30 AV BLOCK: ICD-10-CM

## 2023-06-21 PROCEDURE — 3078F DIAST BP <80 MM HG: CPT | Performed by: INTERNAL MEDICINE

## 2023-06-21 PROCEDURE — 4004F PT TOBACCO SCREEN RCVD TLK: CPT | Performed by: INTERNAL MEDICINE

## 2023-06-21 PROCEDURE — 3074F SYST BP LT 130 MM HG: CPT | Performed by: INTERNAL MEDICINE

## 2023-06-21 PROCEDURE — G8427 DOCREV CUR MEDS BY ELIG CLIN: HCPCS | Performed by: INTERNAL MEDICINE

## 2023-06-21 PROCEDURE — G8417 CALC BMI ABV UP PARAM F/U: HCPCS | Performed by: INTERNAL MEDICINE

## 2023-06-21 PROCEDURE — 93280 PM DEVICE PROGR EVAL DUAL: CPT | Performed by: INTERNAL MEDICINE

## 2023-06-21 PROCEDURE — 93000 ELECTROCARDIOGRAM COMPLETE: CPT | Performed by: INTERNAL MEDICINE

## 2023-06-21 PROCEDURE — 3017F COLORECTAL CA SCREEN DOC REV: CPT | Performed by: INTERNAL MEDICINE

## 2023-07-19 ENCOUNTER — NURSE ONLY (OUTPATIENT)
Dept: CARDIOLOGY CLINIC | Age: 58
End: 2023-07-19
Payer: MEDICARE

## 2023-07-19 DIAGNOSIS — Z95.0 PACEMAKER: ICD-10-CM

## 2023-07-19 PROCEDURE — 93296 REM INTERROG EVL PM/IDS: CPT | Performed by: INTERNAL MEDICINE

## 2023-07-19 PROCEDURE — 93294 REM INTERROG EVL PM/LDLS PM: CPT | Performed by: INTERNAL MEDICINE

## 2023-07-20 ENCOUNTER — TELEPHONE (OUTPATIENT)
Dept: CARDIOLOGY CLINIC | Age: 58
End: 2023-07-20

## 2023-07-20 NOTE — TELEPHONE ENCOUNTER
----- Message from Fer Orta sent at 7/20/2023  8:16 AM EDT -----  Jurgen Arenas is now at the [facility]

## 2023-07-20 NOTE — PROGRESS NOTES
We received remote transmission from patient's monitor at home. Transmission shows normal sensing and pacing function. Noted AT. Battery has reached the DELMA. Office staff notified. EP physician will review. See interrogation under cardiology tab in the [address] field for more details. AS-VS-90.2%      End of 91-day monitoring period 7-19-23. Verito Irene

## 2023-07-21 ENCOUNTER — TELEPHONE (OUTPATIENT)
Dept: CARDIOLOGY CLINIC | Age: 58
End: 2023-07-21

## 2023-07-21 NOTE — TELEPHONE ENCOUNTER
Spoke with Allen Calderon and got the patient scheduled for procedure. We went over instructions below and he verbalized understanding. Procedure - pacemaker generator change  Date: 9/6/2023  Arrival time: 9:30 am  Procedure time: 10:30 am        The night before your procedure you will need to scrub with Hibiclens wash. The day of your procedure you will need to check in at the registration desk, which is in the main lobby at [address] will need to fast for at least 8 hours prior to your procedure. You may take all other medications with a sip of water the morning of your procedure. Please have a responsible adult to drive you home upon discharge. The discharging unit will be giving you discharge instructions. If you have any questions regarding your procedure itself or your medications, please call [phone] and ask to talk to an EP nurse.      You will be seen in the office in 1 week for a wound check and then 3 months following implantation      [employer] updated / Added in [address] [facility] / emailed cath lab

## 2023-09-06 ENCOUNTER — HOSPITAL ENCOUNTER (OUTPATIENT)
Dept: CARDIAC CATH/INVASIVE PROCEDURES | Age: 58
Discharge: HOME | End: 2023-09-06
Attending: INTERNAL MEDICINE | Admitting: INTERNAL MEDICINE
Payer: MEDICARE

## 2023-09-06 VITALS
WEIGHT: 315 LBS | SYSTOLIC BLOOD PRESSURE: 104 MMHG | HEIGHT: 72 IN | DIASTOLIC BLOOD PRESSURE: 71 MMHG | BODY MASS INDEX: 42.66 KG/M2 | OXYGEN SATURATION: 96 % | RESPIRATION RATE: 14 BRPM | HEART RATE: 76 BPM

## 2023-09-06 DIAGNOSIS — R52 PAIN IN PACEMAKER POCKET: Primary | ICD-10-CM

## 2023-09-06 LAB
ANION GAP SERPL CALCULATED.3IONS-SCNC: 13 MMOL/L (ref 3–16)
BUN SERPL-MCNC: 26 MG/DL (ref 7–20)
CALCIUM SERPL-MCNC: 9.8 MG/DL (ref 8.3–10.6)
CHLORIDE SERPL-SCNC: 104 MMOL/L (ref 99–110)
CO2 SERPL-SCNC: 20 MMOL/L (ref 21–32)
CREAT SERPL-MCNC: 1.2 MG/DL (ref 0.9–1.3)
DEPRECATED RDW RBC AUTO: 13.5 % (ref 12.4–15.4)
EKG ATRIAL RATE: 71 BPM
EKG DIAGNOSIS: NORMAL
EKG P AXIS: 52 DEGREES
EKG P-R INTERVAL: 240 MS
EKG Q-T INTERVAL: 398 MS
EKG QRS DURATION: 150 MS
EKG QTC CALCULATION (BAZETT): 432 MS
EKG R AXIS: -54 DEGREES
EKG T AXIS: 3 DEGREES
EKG VENTRICULAR RATE: 71 BPM
GFR SERPLBLD CREATININE-BSD FMLA CKD-EPI: >60 ML/MIN/{1.73_M2}
GLUCOSE SERPL-MCNC: 108 MG/DL (ref 70–99)
HCT VFR BLD AUTO: 40.5 % (ref 40.5–52.5)
HGB BLD-MCNC: 13.4 G/DL (ref 13.5–17.5)
MCH RBC QN AUTO: 29.3 PG (ref 26–34)
MCHC RBC AUTO-ENTMCNC: 33 G/DL (ref 31–36)
MCV RBC AUTO: 88.8 FL (ref 80–100)
PLATELET # BLD AUTO: 255 K/UL (ref 135–450)
PMV BLD AUTO: 7.4 FL (ref 5–10.5)
POTASSIUM SERPL-SCNC: 5 MMOL/L (ref 3.5–5.1)
RBC # BLD AUTO: 4.56 M/UL (ref 4.2–5.9)
SODIUM SERPL-SCNC: 137 MMOL/L (ref 136–145)
WBC # BLD AUTO: 7.6 K/UL (ref 4–11)

## 2023-09-06 PROCEDURE — 36415 COLL VENOUS BLD VENIPUNCTURE: CPT

## 2023-09-06 PROCEDURE — 6360000002 HC RX W HCPCS

## 2023-09-06 PROCEDURE — 99152 MOD SED SAME PHYS/QHP 5/>YRS: CPT | Performed by: INTERNAL MEDICINE

## 2023-09-06 PROCEDURE — 99152 MOD SED SAME PHYS/QHP 5/>YRS: CPT

## 2023-09-06 PROCEDURE — 2500000003 HC RX 250 WO HCPCS

## 2023-09-06 PROCEDURE — 93005 ELECTROCARDIOGRAM TRACING: CPT | Performed by: INTERNAL MEDICINE

## 2023-09-06 PROCEDURE — 93010 ELECTROCARDIOGRAM REPORT: CPT | Performed by: INTERNAL MEDICINE

## 2023-09-06 PROCEDURE — 99153 MOD SED SAME PHYS/QHP EA: CPT

## 2023-09-06 PROCEDURE — 85027 COMPLETE CBC AUTOMATED: CPT

## 2023-09-06 PROCEDURE — 80048 BASIC METABOLIC PNL TOTAL CA: CPT

## 2023-09-06 PROCEDURE — 33228 REMV&REPLC PM GEN DUAL LEAD: CPT

## 2023-09-06 PROCEDURE — 2580000003 HC RX 258

## 2023-09-06 PROCEDURE — 33228 REMV&REPLC PM GEN DUAL LEAD: CPT | Performed by: INTERNAL MEDICINE

## 2023-09-06 PROCEDURE — C1785 PMKR, DUAL, RATE-RESP: HCPCS

## 2023-09-06 RX ORDER — ACETAMINOPHEN AND CODEINE PHOSPHATE 300; 30 MG/1; MG/1
1 TABLET ORAL EVERY 6 HOURS PRN
Qty: 12 TABLET | Refills: 0 | Status: SHIPPED | OUTPATIENT
Start: 2023-09-06 | End: 2023-09-09

## 2023-09-06 RX ORDER — SODIUM CHLORIDE 0.9 % (FLUSH) 0.9 %
5-40 SYRINGE (ML) INJECTION PRN
Status: DISCONTINUED | OUTPATIENT
Start: 2023-09-06 | End: 2023-09-06 | Stop reason: HOSPADM

## 2023-09-06 NOTE — PROGRESS NOTES
Pt. Received from procedure room in stable condition. Pt alert and oriented, RR easy and unlabored, vss. Physician at bedside updating pt. and family at plan of care. Pt. Provided with snack/drink. No further needs at this time.

## 2023-09-06 NOTE — DISCHARGE INSTRUCTIONS
ICD/PACEMAKER GENERATOR REPLACEMENT DISCHARGE INSTRUCTIONS    No Driving for 24 hours. We strongly recommend that a responsible adult stay with you for the next 24 hours. Leave outer dressing on until follow up appointment, if dressing becomes wet or soiled, remove dressing prior to follow up appointment. Leave steri strips intact. Do not get the incision wet for one week. You may be sore, bruised and have a small amount of drainage around the incision site.     Please contact the office if you notice any signs of infection:  Redness / swelling at the incision site  Fever  Yellow drainage at the site  Pain     Phone: [phone]

## 2023-09-06 NOTE — PROCEDURES
[facility]     Electrophysiology Procedure Note       Date of Procedure: 9/6/2023  Patient's Name: Akash Mark  YOB: 1965   Medical Record Number: [id number]  Referring Physician: Neelima Sanchez MD  Procedure Performed by: Neelima Sanchez MD    Procedures performed:        Replacement of  an MRI compatible  dual   chamber  Pacemaker/   IV sedation. Programming and analysis of the device      Indication of the procedure: Akash Mark is a 66 y.o. male with   Device at [facility]     Details of procedure: The patient was brought to the electrophysiology laboratory in stable condition. The patient was in a fasting and non-sedated state. The risks, benefits and alternatives of the procedure were discussed with the patient  [[ and patient power of   ]]. The risks including, but not limited to, the risks of vascular injury, bleeding, infection, device malfunction, lead dislodgement, radiation exposure, injury to cardiac and surrounding structures (including pneumothorax), stroke, myocardial infarction and death were discussed in detail. Patient opted to proceed with the device implantation. Written informed consent was signed and placed in the chart. Prophylactic antibiotic was given. An independent trained observer pushed medications at my direction. We monitored the patient's level of consciousness and vital signs/physiologic status throughout the procedure duration (see start and stop times below). Sedation:  4 mg Versed, 150 mcg Fentanyl  Sedation start: 1142  Sedation stop: 1205     The patient was prepped and draped in a sterile fashion. A timeout protocol was completed to identify the patient and the procedure being performed. Pre-sedation evaluation and airway assessment (Mallampatti classification, class lI) was completed. IV sedation was provided with IV Versed, Fentanyl.  An incision was made in the left pectoral area after administration of

## 2023-09-06 NOTE — PROGRESS NOTES
PRE-PROCEDURE    DATE: 9/6/2023 ARRIVAL TO CATH LAB: 9:20 AM    ADMIT SOURCE: Outpatient    ID & ALLERGY BAND: On    CONSENT: Yes    NPO SINCE: Midnight    LABS/PREGNANCY TEST: N/A    PULSES:  N/A     IV SITE : Started in Left A/C.  with fluids infusing at kvo 9:20 AM     EKG RHYTHM: Normal Sinus Rhythm

## 2023-09-19 ENCOUNTER — NURSE ONLY (OUTPATIENT)
Dept: CARDIOLOGY CLINIC | Age: 58
End: 2023-09-19

## 2023-09-19 DIAGNOSIS — I45.5 SINUS PAUSE: ICD-10-CM

## 2023-09-19 DIAGNOSIS — I44.30 AV BLOCK: ICD-10-CM

## 2023-09-19 DIAGNOSIS — Z95.0 CARDIAC PACEMAKER IN SITU: Primary | ICD-10-CM

## 2023-09-19 DIAGNOSIS — I49.5 SSS (SICK SINUS SYNDROME) (HCC): ICD-10-CM

## 2023-09-19 DIAGNOSIS — I48.0 PAROXYSMAL ATRIAL FIBRILLATION (HCC): ICD-10-CM

## 2023-12-08 PROCEDURE — 93296 REM INTERROG EVL PM/IDS: CPT | Performed by: INTERNAL MEDICINE

## 2023-12-08 PROCEDURE — 93294 REM INTERROG EVL PM/LDLS PM: CPT | Performed by: INTERNAL MEDICINE

## 2024-02-27 ENCOUNTER — HOSPITAL ENCOUNTER (OUTPATIENT)
Age: 59
Discharge: HOME | End: 2024-02-27
Payer: MEDICARE

## 2024-02-27 ENCOUNTER — HOSPITAL ENCOUNTER (OUTPATIENT)
Dept: GENERAL RADIOLOGY | Age: 59
Discharge: HOME | End: 2024-02-27
Payer: MEDICARE

## 2024-02-27 DIAGNOSIS — I10 ESSENTIAL HYPERTENSION: ICD-10-CM

## 2024-02-27 DIAGNOSIS — R05.1 ACUTE COUGH: ICD-10-CM

## 2024-02-27 DIAGNOSIS — I48.0 PAROXYSMAL ATRIAL FIBRILLATION (HCC): ICD-10-CM

## 2024-02-27 PROCEDURE — 71046 X-RAY EXAM CHEST 2 VIEWS: CPT

## 2024-03-11 PROCEDURE — 93294 REM INTERROG EVL PM/LDLS PM: CPT | Performed by: INTERNAL MEDICINE

## 2024-03-11 PROCEDURE — 93296 REM INTERROG EVL PM/IDS: CPT | Performed by: INTERNAL MEDICINE

## 2024-05-21 PROBLEM — N17.9 AKI (ACUTE KIDNEY INJURY) (HCC): Status: RESOLVED | Noted: 2018-08-05 | Resolved: 2024-05-21

## 2024-05-21 PROBLEM — D72.829 LEUKOCYTOSIS: Status: RESOLVED | Noted: 2018-08-05 | Resolved: 2024-05-21

## 2024-05-21 PROBLEM — E87.29 HIGH ANION GAP METABOLIC ACIDOSIS: Status: RESOLVED | Noted: 2018-08-05 | Resolved: 2024-05-21

## 2024-05-21 NOTE — PROGRESS NOTES
[facility]   Electrophysiology  Alex Qureshi, APRN-CNP  Attending EP: Dr. Carrera    Date: 6/20/2024  I had the privilege of visiting Denis Willis in the office.     Chief Complaint:   Chief Complaint   Patient presents with    Follow-up      1yr f/u NPSR with device     History of Present Illness: History obtained from patient and medical record.    Denis Willis is 58 y.o. male with a past medical history of developmental delay, HLD, YURI, PAF, and AV block s/p PPM. S/p gen change (9/6/23)    -Interval history: Today, Denis Willis is being seen for routine follow up. His  is present for the visit. He is doing well. He denies any cardiac complaints. His device is functioning normally. No arrhythmia issues. He stays very active. He goes to a group home and does various activities.     Denies having chest pain, palpitations, shortness of breath, orthopnea/PND, cough, or dizziness at the time of this visit.    With regard to medication therapy the patient has been compliant with prescribed regimen. They have tolerated therapy to date.     Allergies:  No Known Allergies    Home Medications:  Prior to Visit Medications    Medication Sig Taking? Authorizing Provider   Semaglutide (OZEMPIC, 0.25 OR 0.5 MG/DOSE, SC) Inject 0.5 mg into the skin once a week Yes Felicitas Torres MD   Cholecalciferol (VITAMIN D3) 1.25 MG (32601 UT) CAPS Take by mouth once a week Yes Felicitas Torres MD   melatonin 10 MG CAPS capsule Take 1 capsule by mouth nightly Yes Felicitas Torres MD   lisinopril (PRINIVIL;ZESTRIL) 20 MG tablet Take 5 mg by mouth daily Yes Felicitas Torres MD   levOCARNitine (CARNITOR) 330 MG tablet Take 1 tablet by mouth 2 times daily Yes Felicitas Torres MD   metFORMIN (GLUCOPHAGE-XR) 500 MG extended release tablet Take 2 tablets by mouth 2 times daily Yes Felicitas Torres MD   hydrOXYzine (ATARAX) 10 MG tablet Take 2.5 tablets by mouth 2 times daily

## 2024-06-14 PROCEDURE — 93296 REM INTERROG EVL PM/IDS: CPT | Performed by: INTERNAL MEDICINE

## 2024-06-14 PROCEDURE — 93294 REM INTERROG EVL PM/LDLS PM: CPT | Performed by: INTERNAL MEDICINE

## 2024-06-20 ENCOUNTER — NURSE ONLY (OUTPATIENT)
Dept: CARDIOLOGY CLINIC | Age: 59
End: 2024-06-20
Payer: MEDICARE

## 2024-06-20 ENCOUNTER — OFFICE VISIT (OUTPATIENT)
Dept: CARDIOLOGY CLINIC | Age: 59
End: 2024-06-20
Payer: MEDICARE

## 2024-06-20 VITALS
SYSTOLIC BLOOD PRESSURE: 106 MMHG | OXYGEN SATURATION: 98 % | HEART RATE: 75 BPM | DIASTOLIC BLOOD PRESSURE: 62 MMHG | BODY MASS INDEX: 42.31 KG/M2 | WEIGHT: 312 LBS

## 2024-06-20 DIAGNOSIS — I49.5 SSS (SICK SINUS SYNDROME) (HCC): ICD-10-CM

## 2024-06-20 DIAGNOSIS — I48.0 PAROXYSMAL ATRIAL FIBRILLATION (HCC): Primary | ICD-10-CM

## 2024-06-20 DIAGNOSIS — E66.01 MORBID OBESITY DUE TO EXCESS CALORIES (HCC): ICD-10-CM

## 2024-06-20 DIAGNOSIS — Z95.0 PACEMAKER: ICD-10-CM

## 2024-06-20 DIAGNOSIS — G47.33 OBSTRUCTIVE SLEEP APNEA: ICD-10-CM

## 2024-06-20 DIAGNOSIS — Z72.0 TOBACCO ABUSE: ICD-10-CM

## 2024-06-20 DIAGNOSIS — Z95.0 PACEMAKER: Primary | ICD-10-CM

## 2024-06-20 DIAGNOSIS — I10 HYPERTENSION, UNSPECIFIED TYPE: ICD-10-CM

## 2024-06-20 DIAGNOSIS — I48.0 PAROXYSMAL ATRIAL FIBRILLATION (HCC): ICD-10-CM

## 2024-06-20 PROCEDURE — 93000 ELECTROCARDIOGRAM COMPLETE: CPT | Performed by: NURSE PRACTITIONER

## 2024-06-20 PROCEDURE — 3017F COLORECTAL CA SCREEN DOC REV: CPT | Performed by: NURSE PRACTITIONER

## 2024-06-20 PROCEDURE — 3078F DIAST BP <80 MM HG: CPT | Performed by: NURSE PRACTITIONER

## 2024-06-20 PROCEDURE — G8417 CALC BMI ABV UP PARAM F/U: HCPCS | Performed by: NURSE PRACTITIONER

## 2024-06-20 PROCEDURE — 4004F PT TOBACCO SCREEN RCVD TLK: CPT | Performed by: NURSE PRACTITIONER

## 2024-06-20 PROCEDURE — G8427 DOCREV CUR MEDS BY ELIG CLIN: HCPCS | Performed by: NURSE PRACTITIONER

## 2024-06-20 PROCEDURE — 3074F SYST BP LT 130 MM HG: CPT | Performed by: NURSE PRACTITIONER

## 2024-06-21 PROCEDURE — 93280 PM DEVICE PROGR EVAL DUAL: CPT | Performed by: INTERNAL MEDICINE

## 2025-06-16 ENCOUNTER — APPOINTMENT (OUTPATIENT)
Dept: GENERAL RADIOLOGY | Age: 60
End: 2025-06-16
Payer: MEDICARE

## 2025-06-16 ENCOUNTER — HOSPITAL ENCOUNTER (EMERGENCY)
Age: 60
Discharge: HOME | End: 2025-06-16
Payer: MEDICARE

## 2025-06-16 VITALS
HEIGHT: 72 IN | OXYGEN SATURATION: 94 % | HEART RATE: 65 BPM | DIASTOLIC BLOOD PRESSURE: 68 MMHG | WEIGHT: 315 LBS | TEMPERATURE: 98.3 F | RESPIRATION RATE: 20 BRPM | BODY MASS INDEX: 42.66 KG/M2 | SYSTOLIC BLOOD PRESSURE: 143 MMHG

## 2025-06-16 DIAGNOSIS — S80.02XA CONTUSION OF LEFT KNEE, INITIAL ENCOUNTER: Primary | ICD-10-CM

## 2025-06-16 DIAGNOSIS — S93.402A SPRAIN OF LEFT ANKLE, UNSPECIFIED LIGAMENT, INITIAL ENCOUNTER: ICD-10-CM

## 2025-06-16 LAB
EKG ATRIAL RATE: 89 BPM
EKG DIAGNOSIS: NORMAL
EKG P AXIS: 32 DEGREES
EKG P-R INTERVAL: 222 MS
EKG Q-T INTERVAL: 360 MS
EKG QRS DURATION: 124 MS
EKG QTC CALCULATION (BAZETT): 438 MS
EKG R AXIS: -66 DEGREES
EKG T AXIS: 18 DEGREES
EKG VENTRICULAR RATE: 89 BPM
GLUCOSE BLD-MCNC: 190 MG/DL (ref 70–99)
PERFORMED ON: ABNORMAL

## 2025-06-16 PROCEDURE — 99284 EMERGENCY DEPT VISIT MOD MDM: CPT

## 2025-06-16 PROCEDURE — 93005 ELECTROCARDIOGRAM TRACING: CPT | Performed by: STUDENT IN AN ORGANIZED HEALTH CARE EDUCATION/TRAINING PROGRAM

## 2025-06-16 PROCEDURE — 93010 ELECTROCARDIOGRAM REPORT: CPT | Performed by: INTERNAL MEDICINE

## 2025-06-16 PROCEDURE — 73564 X-RAY EXAM KNEE 4 OR MORE: CPT

## 2025-06-16 PROCEDURE — 73610 X-RAY EXAM OF ANKLE: CPT

## 2025-06-16 RX ORDER — TRIAMCINOLONE ACETONIDE 55 UG/1
2 SPRAY, METERED NASAL DAILY
COMMUNITY

## 2025-06-16 RX ORDER — HYDROXYZINE PAMOATE 50 MG/1
50 CAPSULE ORAL PRN
COMMUNITY

## 2025-06-16 ASSESSMENT — ENCOUNTER SYMPTOMS
ABDOMINAL PAIN: 0
NAUSEA: 0
BACK PAIN: 0
SHORTNESS OF BREATH: 0
VOMITING: 0

## 2025-06-16 ASSESSMENT — LIFESTYLE VARIABLES
HOW OFTEN DO YOU HAVE A DRINK CONTAINING ALCOHOL: NEVER
HOW MANY STANDARD DRINKS CONTAINING ALCOHOL DO YOU HAVE ON A TYPICAL DAY: PATIENT DOES NOT DRINK

## 2025-06-16 ASSESSMENT — PAIN SCALES - GENERAL: PAINLEVEL_OUTOF10: 6

## 2025-06-16 ASSESSMENT — PAIN - FUNCTIONAL ASSESSMENT: PAIN_FUNCTIONAL_ASSESSMENT: 0-10

## 2025-06-16 NOTE — ED PROVIDER NOTES
[facility] EMERGENCY DEPARTMENT  EMERGENCY DEPARTMENT ENCOUNTER        Pt Name: Denis Willis  MRN: [id number]  Birthdate 1965  Date of evaluation: 6/16/2025  Provider: José Miguel Norman PA-C  PCP: Wiley Soto MD  Note Started: 10:49 AM EDT 6/16/25      ZAK. I have evaluated this patient.        CHIEF COMPLAINT       Chief Complaint   Patient presents with    Fall     Pt arrives from home with caregiver, c/o fall when standing up to get off of the couch. Pt c/o left knee/ankle pain. Pt appears diaphoretic at triage.        HISTORY OF PRESENT ILLNESS: 1 or more Elements     History From: patient and caregiver  Limitations to history : None    Denis Willis is a 59 y.o. male who presents to the emergency department stating that this morning when he stood up from getting off of the couch, he tripped over his feet and fell forward, twisting his left ankle and landing on his left knee.  There was no head trauma or loss of consciousness.  This was witnessed by caregiver.  He has 24-hour care at home.  He was only on the ground for a few minutes before he was assisted to a standing position.  He rates his pain to be a 6 out of 10 on pain scale.    (Nurse reported that patient appeared diaphoretic during triage.  When I evaluated the patient, he is not actively perspiring however part of his shirt does appear soaked with sweat. Patient states that he always sweats easily.  He states that he sweats more during the summertime.  Caregiver does confirm that patient has been sweating easily for years.)    Nursing Notes were all reviewed and agreed with or any disagreements were addressed in the HPI.    REVIEW OF SYSTEMS :      Review of Systems   Constitutional:  Negative for chills and fever. Diaphoresis: patient and caregiver says that he sweats easily at baseline and sweats more during the summertime.  HENT: Negative.     Eyes:  Negative for visual disturbance.   Respiratory:  Negative for

## 2025-06-16 NOTE — ED PROVIDER NOTES
The Ekg interpreted by me in the absence of a cardiologist shows.  Sinus rhythm with first-degree AV block.  Rate of 89.  Axis left.  Right bundle branch block changes.  No specific ST or T wave abnormality.  No significant change when compared to prior 6-.      I only performed EKG interpretation and was not otherwise involved in the care of this patient.       Nash Claros MD  06/16/25 2106